# Patient Record
Sex: FEMALE | Race: BLACK OR AFRICAN AMERICAN | NOT HISPANIC OR LATINO | URBAN - METROPOLITAN AREA
[De-identification: names, ages, dates, MRNs, and addresses within clinical notes are randomized per-mention and may not be internally consistent; named-entity substitution may affect disease eponyms.]

---

## 2017-01-17 ENCOUNTER — HOSPITAL ENCOUNTER (EMERGENCY)
Facility: MEDICAL CENTER | Age: 22
End: 2017-01-17
Attending: EMERGENCY MEDICINE

## 2017-01-17 VITALS
HEIGHT: 60 IN | TEMPERATURE: 98 F | RESPIRATION RATE: 18 BRPM | WEIGHT: 140 LBS | BODY MASS INDEX: 27.48 KG/M2 | SYSTOLIC BLOOD PRESSURE: 134 MMHG | DIASTOLIC BLOOD PRESSURE: 75 MMHG | HEART RATE: 112 BPM | OXYGEN SATURATION: 96 %

## 2017-01-17 DIAGNOSIS — F19.10 POLYSUBSTANCE ABUSE (HCC): ICD-10-CM

## 2017-01-17 DIAGNOSIS — R41.82 ALTERED MENTAL STATUS, UNSPECIFIED ALTERED MENTAL STATUS TYPE: ICD-10-CM

## 2017-01-17 LAB
ALBUMIN SERPL BCP-MCNC: 3.7 G/DL (ref 3.2–4.9)
ALBUMIN/GLOB SERPL: 1.5 G/DL
ALP SERPL-CCNC: 58 U/L (ref 30–99)
ALT SERPL-CCNC: 21 U/L (ref 2–50)
AMPHETAMINES UR QL: POSITIVE
ANION GAP SERPL CALC-SCNC: 9 MMOL/L (ref 0–11.9)
APAP SERPL-MCNC: <10 UG/ML (ref 10–30)
AST SERPL-CCNC: 34 U/L (ref 12–45)
BARBITURATES UR QL SCN: NEGATIVE
BASOPHILS # BLD AUTO: 0.4 % (ref 0–1.8)
BASOPHILS # BLD: 0.03 K/UL (ref 0–0.12)
BENZODIAZ UR QL SCN: NEGATIVE
BILIRUB SERPL-MCNC: 1.2 MG/DL (ref 0.1–1.5)
BUN SERPL-MCNC: 14 MG/DL (ref 8–22)
BZE UR QL SCN: NEGATIVE
CALCIUM SERPL-MCNC: 9 MG/DL (ref 8.4–10.2)
CHLORIDE SERPL-SCNC: 101 MMOL/L (ref 96–112)
CO2 SERPL-SCNC: 24 MMOL/L (ref 20–33)
CREAT SERPL-MCNC: 0.99 MG/DL (ref 0.5–1.4)
EKG IMPRESSION: NORMAL
EOSINOPHIL # BLD AUTO: 0.18 K/UL (ref 0–0.51)
EOSINOPHIL NFR BLD: 2.2 % (ref 0–6.9)
ERYTHROCYTE [DISTWIDTH] IN BLOOD BY AUTOMATED COUNT: 47.1 FL (ref 35.9–50)
ETHANOL BLD-MCNC: 0 G/DL
GFR SERPL CREATININE-BSD FRML MDRD: >60 ML/MIN/1.73 M 2
GLOBULIN SER CALC-MCNC: 2.5 G/DL (ref 1.9–3.5)
GLUCOSE SERPL-MCNC: 79 MG/DL (ref 65–99)
HCG SERPL QL: NEGATIVE
HCT VFR BLD AUTO: 34.9 % (ref 37–47)
HGB BLD-MCNC: 11.3 G/DL (ref 12–16)
IMM GRANULOCYTES # BLD AUTO: 0.03 K/UL (ref 0–0.11)
IMM GRANULOCYTES NFR BLD AUTO: 0.4 % (ref 0–0.9)
LYMPHOCYTES # BLD AUTO: 3.33 K/UL (ref 1–4.8)
LYMPHOCYTES NFR BLD: 39.9 % (ref 22–41)
MCH RBC QN AUTO: 27.4 PG (ref 27–33)
MCHC RBC AUTO-ENTMCNC: 32.4 G/DL (ref 33.6–35)
MCV RBC AUTO: 84.5 FL (ref 81.4–97.8)
MONOCYTES # BLD AUTO: 0.8 K/UL (ref 0–0.85)
MONOCYTES NFR BLD AUTO: 9.6 % (ref 0–13.4)
NEUTROPHILS # BLD AUTO: 3.98 K/UL (ref 2–7.15)
NEUTROPHILS NFR BLD: 47.5 % (ref 44–72)
NRBC # BLD AUTO: 0 K/UL
NRBC BLD AUTO-RTO: 0 /100 WBC
PCP UR QL SCN: NEGATIVE
PLATELET # BLD AUTO: 258 K/UL (ref 164–446)
PMV BLD AUTO: 11.1 FL (ref 9–12.9)
POTASSIUM SERPL-SCNC: 3.1 MMOL/L (ref 3.6–5.5)
PROT SERPL-MCNC: 6.2 G/DL (ref 6–8.2)
RBC # BLD AUTO: 4.13 M/UL (ref 4.2–5.4)
SALICYLATES SERPL-MCNC: <4 MG/DL (ref 15–25)
SODIUM SERPL-SCNC: 134 MMOL/L (ref 135–145)
UR OPIATES 2659: POSITIVE
UR THC 2511T: POSITIVE
UR TRICYCLIC 2660: NEGATIVE
WBC # BLD AUTO: 8.4 K/UL (ref 4.8–10.8)

## 2017-01-17 PROCEDURE — 84703 CHORIONIC GONADOTROPIN ASSAY: CPT

## 2017-01-17 PROCEDURE — 99285 EMERGENCY DEPT VISIT HI MDM: CPT

## 2017-01-17 PROCEDURE — 93005 ELECTROCARDIOGRAM TRACING: CPT | Performed by: EMERGENCY MEDICINE

## 2017-01-17 PROCEDURE — 96372 THER/PROPH/DIAG INJ SC/IM: CPT

## 2017-01-17 PROCEDURE — 85025 COMPLETE CBC W/AUTO DIFF WBC: CPT

## 2017-01-17 PROCEDURE — 700111 HCHG RX REV CODE 636 W/ 250 OVERRIDE (IP): Performed by: EMERGENCY MEDICINE

## 2017-01-17 PROCEDURE — 80053 COMPREHEN METABOLIC PANEL: CPT

## 2017-01-17 PROCEDURE — 700105 HCHG RX REV CODE 258: Performed by: EMERGENCY MEDICINE

## 2017-01-17 PROCEDURE — 96365 THER/PROPH/DIAG IV INF INIT: CPT

## 2017-01-17 PROCEDURE — 80305 DRUG TEST PRSMV DIR OPT OBS: CPT

## 2017-01-17 PROCEDURE — 96361 HYDRATE IV INFUSION ADD-ON: CPT

## 2017-01-17 PROCEDURE — 36415 COLL VENOUS BLD VENIPUNCTURE: CPT

## 2017-01-17 PROCEDURE — 80307 DRUG TEST PRSMV CHEM ANLYZR: CPT

## 2017-01-17 RX ORDER — POTASSIUM CHLORIDE 7.45 MG/ML
10 INJECTION INTRAVENOUS ONCE
Status: COMPLETED | OUTPATIENT
Start: 2017-01-17 | End: 2017-01-17

## 2017-01-17 RX ORDER — SODIUM CHLORIDE 9 MG/ML
1000 INJECTION, SOLUTION INTRAVENOUS ONCE
Status: COMPLETED | OUTPATIENT
Start: 2017-01-17 | End: 2017-01-17

## 2017-01-17 RX ORDER — LORAZEPAM 2 MG/ML
1 INJECTION INTRAMUSCULAR ONCE
Status: COMPLETED | OUTPATIENT
Start: 2017-01-17 | End: 2017-01-17

## 2017-01-17 RX ORDER — DIPHENHYDRAMINE HYDROCHLORIDE 50 MG/ML
25 INJECTION INTRAMUSCULAR; INTRAVENOUS ONCE
Status: COMPLETED | OUTPATIENT
Start: 2017-01-17 | End: 2017-01-17

## 2017-01-17 RX ORDER — HALOPERIDOL 5 MG/ML
5 INJECTION INTRAMUSCULAR ONCE
Status: COMPLETED | OUTPATIENT
Start: 2017-01-17 | End: 2017-01-17

## 2017-01-17 RX ORDER — SULFAMETHOXAZOLE AND TRIMETHOPRIM 800; 160 MG/1; MG/1
1 TABLET ORAL 2 TIMES DAILY
Qty: 14 TAB | Refills: 0 | Status: SHIPPED | OUTPATIENT
Start: 2017-01-17 | End: 2017-01-24

## 2017-01-17 RX ADMIN — DIPHENHYDRAMINE HYDROCHLORIDE 25 MG: 50 INJECTION INTRAMUSCULAR; INTRAVENOUS at 08:15

## 2017-01-17 RX ADMIN — HALOPERIDOL LACTATE 5 MG: 5 INJECTION, SOLUTION INTRAMUSCULAR at 08:14

## 2017-01-17 RX ADMIN — POTASSIUM CHLORIDE 10 MEQ: 7.46 INJECTION, SOLUTION INTRAVENOUS at 11:27

## 2017-01-17 RX ADMIN — SODIUM CHLORIDE 1000 ML: 9 INJECTION, SOLUTION INTRAVENOUS at 09:18

## 2017-01-17 RX ADMIN — LORAZEPAM 1 MG: 2 INJECTION INTRAMUSCULAR; INTRAVENOUS at 08:14

## 2017-01-17 ASSESSMENT — PAIN SCALES - GENERAL: PAINLEVEL_OUTOF10: ASSUMED PAIN PRESENT

## 2017-01-17 NOTE — ED PROVIDER NOTES
ED Provider Note    CHIEF COMPLAINT  Chief Complaint   Patient presents with   • ALOC       HPI  Miley Henry is a 21 y.o. female who presents to the ER with altered mental status. [place] There is not been any known injury. She denied drinking alcohol or using any drugs although presented quite agitated. She states she hurts all over. She cannot tell me any specific area that is bothering her. She denies having a fever. She hasn't been coughing. She yells periodically.    REVIEW OF SYSTEMS  Unable to obtain    PAST MEDICAL HISTORY  Asthma, seizure, unspecified psychiatric disturbance    SOCIAL HISTORY  Social History   Substance Use Topics   • Smoking status: Current Every Day Smoker   • Smokeless tobacco: None   • Alcohol Use: No       SURGICAL HISTORY  History reviewed. No pertinent past surgical history.    CURRENT MEDICATIONS  See chart    ALLERGIES  Allergies   Allergen Reactions   • Milk-Related Compounds Diarrhea and Vomiting   • Peanut-Derived Anaphylaxis   • Penicillins Anaphylaxis       PHYSICAL EXAM  VITAL SIGNS: BP 99/64 mmHg  Pulse 109  Temp(Src) 36.7 °C (98 °F)  Resp 12  Ht 1.524 m (5')  Wt 63.504 kg (140 lb)  BMI 27.34 kg/m2  SpO2 97%   Constitutional: Awake and alert. Agitated. Scratching himself vigorously  HENT:  Atraumatic, Normocephalic.Oropharynx moist mucus membranes, Nose normal inspection.   Eyes: Normal inspection  Neck: Supple  Cardiovascular: Mildly tachycardic heart rate, Normal rhythm.  Symmetric peripheral pulses.   Thorax & Lungs: No respiratory distress, No wheezing, No rales, No rhonchi, No chest tenderness.   Abdomen: Bowel sounds normal, soft, non-distended, nontender, no mass  Skin: Multiple areas of excoriation. Several areas of apparent picking behavior over the dorsal hand with mild surrounding redness.  Back: No tenderness, No CVA tenderness.   Extremities: No clubbing, cyanosis, edema, no Homans or cords    Neurologic: Awake and alert. Moves all extremities symmetrically.  Psychiatric: Agitated. Occasionally aggressive    Labs:  Results for orders placed or performed during the hospital encounter of 01/17/17   CBC WITH DIFFERENTIAL   Result Value Ref Range    WBC 8.4 4.8 - 10.8 K/uL    RBC 4.13 (L) 4.20 - 5.40 M/uL    Hemoglobin 11.3 (L) 12.0 - 16.0 g/dL    Hematocrit 34.9 (L) 37.0 - 47.0 %    MCV 84.5 81.4 - 97.8 fL    MCH 27.4 27.0 - 33.0 pg    MCHC 32.4 (L) 33.6 - 35.0 g/dL    RDW 47.1 35.9 - 50.0 fL    Platelet Count 258 164 - 446 K/uL    MPV 11.1 9.0 - 12.9 fL    Neutrophils-Polys 47.50 44.00 - 72.00 %    Lymphocytes 39.90 22.00 - 41.00 %    Monocytes 9.60 0.00 - 13.40 %    Eosinophils 2.20 0.00 - 6.90 %    Basophils 0.40 0.00 - 1.80 %    Immature Granulocytes 0.40 0.00 - 0.90 %    Nucleated RBC 0.00 /100 WBC    Neutrophils (Absolute) 3.98 2.00 - 7.15 K/uL    Lymphs (Absolute) 3.33 1.00 - 4.80 K/uL    Monos (Absolute) 0.80 0.00 - 0.85 K/uL    Eos (Absolute) 0.18 0.00 - 0.51 K/uL    Baso (Absolute) 0.03 0.00 - 0.12 K/uL    Immature Granulocytes (abs) 0.03 0.00 - 0.11 K/uL    NRBC (Absolute) 0.00 K/uL   COMP METABOLIC PANEL   Result Value Ref Range    Sodium 134 (L) 135 - 145 mmol/L    Potassium 3.1 (L) 3.6 - 5.5 mmol/L    Chloride 101 96 - 112 mmol/L    Co2 24 20 - 33 mmol/L    Anion Gap 9.0 0.0 - 11.9    Glucose 79 65 - 99 mg/dL    Bun 14 8 - 22 mg/dL    Creatinine 0.99 0.50 - 1.40 mg/dL    Calcium 9.0 8.4 - 10.2 mg/dL    AST(SGOT) 34 12 - 45 U/L    ALT(SGPT) 21 2 - 50 U/L    Alkaline Phosphatase 58 30 - 99 U/L    Total Bilirubin 1.2 0.1 - 1.5 mg/dL    Albumin 3.7 3.2 - 4.9 g/dL    Total Protein 6.2 6.0 - 8.2 g/dL    Globulin 2.5 1.9 - 3.5 g/dL    A-G Ratio 1.5 g/dL   HCG QUAL SERUM   Result Value Ref Range    Beta-Hcg Qualitative Serum Negative Negative   Acetaminophen Level   Result Value Ref Range    Acetomenophen -Tylenol <10 10 - 30 ug/mL   SALICYLATE LEVEL   Result Value Ref Range    Salicylates, Quant. <4  (L) 15 - 25 mg/dL   Blood Alcohol   Result Value Ref Range    Diagnostic Alcohol 0.00 0.00 g/dL   ESTIMATED GFR   Result Value Ref Range    GFR If African American >60 >60 mL/min/1.73 m 2    GFR If Non African American >60 >60 mL/min/1.73 m 2   UR DRUG SCREEN([facility] ONLY)   Result Value Ref Range    Phencyclidine -Pcp Negative Negative    Benzodiazepines Negative Negative    Cocaine Metabolite Negative Negative    Amphetamines By Triage Positive (A) Negative    Urine THC Positive (A) Negative    Codeine-Morphine Positive (A) Negative    Barbiturates Negative Negative    Tricyclic Antidepressants Negative Negative   EKG (NOW)   Result Value Ref Range    Report       [facility] Emergency Dept.    Test Date:  2017  Pt Name:    KHALIF GRESHAM               Department: EDSM  MRN:        [id number]                      [facility]  Gender:     F                            Technician: KJ  :        [id number]                   Requested By:JESENIA RIVAS  Order #:    [id number]                    Reading MD: JESENIA RIVAS MD    Measurements  Intervals                                Axis  Rate:       77                           P:          24  NY:         152                          QRS:        56  QRSD:       90                           T:          47  QT:         340  QTc:        385    Interpretive Statements  SINUS RHYTHM  BORDERLINE T WAVE ABNORMALITIES  No previous ECG available for comparison    Electronically Signed On 2017 13:46:25 PST by JESENIA RIVAS MD           COURSE & MEDICAL DECISION MAKING  Patient presents to the ER with altered mental status. Her clinical presentation is most consistent with amphetamine abuse. She does not have any evidence of trauma on her exam. She complained of pain everywhere, but specifically denied a headache. She was afebrile. She does appear to have areas of superficial skin infection on her exam. I obtained laboratory  data . She has mild hypokalemia and was given KCl 10 mEq IV. No evidence of toxic overdose. She was quite agitated on arrival and aggressive. She was sedated with Benadryl, Haldol and Ativan. At this point she is continuing to metabolize these medications although is waking up and interacting more. When she is sober she will be reassessed and dispositioned appropriately.    FINAL IMPRESSION  1. Altered mental status  2. Polysubstance abuse  3. Agitation       This dictation was created using voice recognition software. The accuracy of the dictation is limited to the abilities of the software.  The nursing notes were reviewed and certain aspects of this information were incorporated into this note.      Electronically signed by: Ronny Oden, 1/17/2017 1:43 PM

## 2017-01-17 NOTE — DISCHARGE INSTRUCTIONS
Substance Abuse  Your exam indicates that you have a problem with substance abuse. Substance abuse is the misuse of alcohol or drugs that causes problems in family life, friendships, and work relationships. Substance abuse is the most important cause of premature illness, disability, and death in our society. It is also the greatest threat to a person's mental and spiritual well being.  Substance abuse can start out in an innocent way, such as social drinking or taking a little extra medication prescribed by your doctor. No one starts out with the intention of becoming an alcoholic or an addict. Substance abuse victims cannot control their use of alcohol or drugs. They may become intoxicated daily or go on weekend binges. Often there is a strong desire to quit, but attempts to stop using often fail. Encounters with law enforcement or conflicts with family members, friends, and work associates are signs of a potential problem.  Recovery is always possible, although the craving for some drugs makes it difficult to quit without assistance. Many treatment programs are available to help people stop abusing alcohol or drugs. The first step in treatment is to admit you have a problem. This is a major mt because denial is a powerful force with substance abuse.  Alcoholics Anonymous, Narcotics Anonymous, Cocaine Anonymous, and other recovery groups and programs can be very useful in helping people to quit. If you do not feel okay about your drug or alcohol use and if it is causing you trouble, we want to encourage you to talk about it with your doctor or with someone from a recovery group who can help you. You could also call the National [place] on Drug Abuse at 6-129-562-HELP. It is up to you to take the first step.  AL-NIKKIE and MAHIN-TEEN are support groups for friends and family members of an alcohol or drug dependent person. The people who love and care for the alcoholic or addicted person often need help, too. For  information about these organizations, check your phone directory or call a local alcohol or drug treatment center.  Document Released: 01/25/2006 Document Revised: 03/11/2013 Document Reviewed: 12/19/2009  [employer]® Patient Information ©2014 [employer], [employer].

## 2017-01-17 NOTE — ED NOTES
Pt awakened from sleep. Pt given apple juice. Pt assisted w dressing per edt. Bus pass obtained for pt's use.

## 2017-01-17 NOTE — ED AVS SNAPSHOT
Audibase Access Code: [email or web address]  Expires: 2/16/2017  3:57 PM    Your email address is not on file at [facility].  Email Addresses are required for you to sign up for Audibase, please contact [phone] to verify your personal information and to provide your email address prior to attempting to register for Audibase.    Miley Henry  [facility] [place] [address]    Audibase  A secure, online tool to manage your health information     [employer]’s Audibase® is a secure, online tool that connects you to your personalized health information from the privacy of your home -- day or night - making it very easy for you to manage your healthcare. Once the activation process is completed, you can even access your medical information using the Audibase dar, which is available for free in the Apple Dar store or Google Play store.     To learn more about Audibase, visit [email or web address]    There are two levels of access available (as shown below):   My Chart Features  [facility] Primary Care Doctor [facility]  Specialists [facility]  Urgent  Care Non-[facility] Primary Care Doctor   Email your healthcare team securely and privately 24/7 X X X    Manage appointments: schedule your next appointment; view details of past/upcoming appointments X      Request prescription refills. X      View recent personal medical records, including lab and immunizations X X X X   View health record, including health history, allergies, medications X X X X   Read reports about your outpatient visits, procedures, consult and ER notes X X X X   See your discharge summary, which is a recap of your hospital and/or ER visit that includes your diagnosis, lab results, and care plan X X  X     How to register for Audibase:  Once your e-mail address has been verified, follow the following steps to sign up for Audibase.     1. Go to  [email or web address]  2. Click on the Sign Up Now box, which takes you to the New Member Sign Up page. You will  need to provide the following information:  a. Enter your [employer] Access Code exactly as it appears at the top of this page. (You will not need to use this code after you’ve completed the sign-up process. If you do not sign up before the expiration date, you must request a new code.)   b. Enter your date of birth.   c. Enter your home email address.   d. Click Submit, and follow the next screen’s instructions.  3. Create a [employer] ID. This will be your [employer] login ID and cannot be changed, so think of one that is secure and easy to remember.  4. Create a [employer] password. You can change your password at any time.  5. Enter your Password Reset Question and Answer. This can be used at a later time if you forget your password.   6. Enter your e-mail address. This allows you to receive e-mail notifications when new information is available in [employer].  7. Click Sign Up. You can now view your health information.    For assistance activating your [employer] account, call [phone]
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
## 2017-01-17 NOTE — ED AVS SNAPSHOT
1/17/2017          Miley Henry  [address]  [place] [address]    Dear Miley:    [facility] wants to ensure your discharge home is safe and you or your loved ones have had all your questions answered regarding your care after you leave the hospital.    You may receive a telephone call within two days of your discharge.  This call is to make certain you understand your discharge instructions as well as ensure we provided you with the best care possible during your stay with us.     The call will only last approximately 3-5 minutes and will be done by a nurse.    Once again, we want to ensure your discharge home is safe and that you have a clear understanding of any next steps in your care.  If you have any questions or concerns, please do not hesitate to contact us, we are here for you.  Thank you for choosing [facility] for your healthcare needs.    Sincerely,    Phil Fischer    [facility]
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
## 2017-01-17 NOTE — ED NOTES
"Daron steel from [facility]. damián reports pt slumped over in chair at [facility]. Pt states she smokes cigarettes. Pt denies drug or alcohol use. Pt mumbles. Pt telling staff to \"fuck off\". Dr Oden at bedside for pt exam.   "

## 2017-01-17 NOTE — ED AVS SNAPSHOT
After Visit Summary                                                                                                                Miley Henry   MRN: [id number]    Department:  [facility], Emergency Dept   Date of Visit:  1/17/2017            [facility], Emergency Dept    [address]    [place] [address]    Phone:  [phone]      You were seen by     1. Ronny Oden M.D.    2. Francie Church D.O.      Your Diagnosis Was     Altered mental status, unspecified altered mental status type     R41.82       These are the medications you received during your hospitalization from 01/17/2017 0752 to 01/17/2017 1606     Date/Time Order Dose Route Action    01/17/2017 0814 lorazepam (ATIVAN) injection 1 mg 1 mg Intramuscular Given    01/17/2017 0814 haloperidol lactate (HALDOL) injection 5 mg 5 mg Intramuscular Given    01/17/2017 0815 diphenhydrAMINE (BENADRYL) injection 25 mg 25 mg Intramuscular Given    01/17/2017 0918 NS infusion 1,000 mL 1,000 mL Intravenous New Bag    01/17/2017 1127 potassium chloride in water (KCL) ivpb 10 mEq 10 mEq Intravenous New Bag      Follow-up Information     1. Follow up with Kayla Hernandez M.D..    Specialty:  Family Medicine    Contact information    [address] [place] [address]  [address]  [place] [address] [phone]        Medication Information     Review all of your home medications and newly ordered medications with your primary doctor and/or pharmacist as soon as possible. Follow medication instructions as directed by your doctor and/or pharmacist.     Please keep your complete medication list with you and share with your physician. Update the information when medications are discontinued, doses are changed, or new medications (including over-the-counter products) are added; and carry medication information at all times in the event of emergency situations.               Medication List      START taking these medications        Instructions    sulfamethoxazole-trimethoprim 800-160 MG tablet   Commonly known as:  BACTRIM DS    Take 1 Tab by mouth 2 times a day for 7 days.   Dose:  1 Tab         ASK your doctor about these medications        Instructions    acetaminophen-codeine #3 300-30 MG Tabs   Commonly known as:  TYLENOL #3    Take 1 Tab by mouth every four hours as needed for Mild Pain (mild-to-moderate pain). Maximum 12 tabs in 24 hours   Dose:  1 Tab       clonidine 0.2 MG Tabs   Commonly known as:  CATAPRESS    Take 0.2 mg by mouth 2 times a day.   Dose:  0.2 mg       ibuprofen 600 MG Tabs   Commonly known as:  MOTRIN    Take 600 mg by mouth every 6 hours as needed.   Dose:  600 mg       LAMICTAL 25 MG Tabs   Generic drug:  lamotrigine    Take 50 mg by mouth every day.   Dose:  50 mg       polymixin-trimethoprim 67829-5.1 UNIT/ML-% Soln   Commonly known as:  POLYTRIM    2 Drops by Ophthalmic route 3 times a day.   Dose:  2 Drop       QVAR 80 MCG/ACT inhaler   Generic drug:  beclomethasone    Inhale 1 Puff by mouth 2 times a day.   Dose:  1 Puff       RITALIN 10 MG Tabs   Generic drug:  methylphenidate    Take 10 mg by mouth 2 times a day.   Dose:  10 mg       * SEROQUEL 50 MG tablet   Generic drug:  quetiapine    Take 50 mg by mouth 2 times a day.   Dose:  50 mg       * SEROQUEL 100 MG Tabs   Generic drug:  quetiapine    Take 100 mg by mouth 2 times a day.   Dose:  100 mg       VENTOLIN  (90 BASE) MCG/ACT Aers inhalation aerosol   Generic drug:  albuterol    Inhale 2 Puffs by mouth every 6 hours as needed.   Dose:  2 Puff       * Notice:  This list has 2 medication(s) that are the same as other medications prescribed for you. Read the directions carefully, and ask your doctor or other care provider to review them with you.            Procedures and tests performed during your visit     Acetaminophen Level    Blood Alcohol    CBC WITH DIFFERENTIAL    COMP METABOLIC PANEL    EKG (NOW)    ESTIMATED GFR    HCG QUAL SERUM     SALICYLATE LEVEL    UR DRUG SCREEN(SO FRAUSTO ONLY)        Discharge Instructions       Substance Abuse  Your exam indicates that you have a problem with substance abuse. Substance abuse is the misuse of alcohol or drugs that causes problems in family life, friendships, and work relationships. Substance abuse is the most important cause of premature illness, disability, and death in our society. It is also the greatest threat to a person's mental and spiritual well being.  Substance abuse can start out in an innocent way, such as social drinking or taking a little extra medication prescribed by your doctor. No one starts out with the intention of becoming an alcoholic or an addict. Substance abuse victims cannot control their use of alcohol or drugs. They may become intoxicated daily or go on weekend binges. Often there is a strong desire to quit, but attempts to stop using often fail. Encounters with law enforcement or conflicts with family members, friends, and work associates are signs of a potential problem.  Recovery is always possible, although the craving for some drugs makes it difficult to quit without assistance. Many treatment programs are available to help people stop abusing alcohol or drugs. The first step in treatment is to admit you have a problem. This is a major mt because denial is a powerful force with substance abuse.  Alcoholics Anonymous, Narcotics Anonymous, Cocaine Anonymous, and other recovery groups and programs can be very useful in helping people to quit. If you do not feel okay about your drug or alcohol use and if it is causing you trouble, we want to encourage you to talk about it with your doctor or with someone from a recovery group who can help you. You could also call the National [place] on Drug Abuse at 9-683-042-HELP. It is up to you to take the first step.  [employer]ON and ALA-TEEN are support groups for friends and family members of an alcohol or drug dependent person. The  people who love and care for the alcoholic or addicted person often need help, too. For information about these organizations, check your phone directory or call a local alcohol or drug treatment center.  Document Released: 01/25/2006 Document Revised: 03/11/2013 Document Reviewed: 12/19/2009  Exit[employer]® Patient Information ©2014 [employer].            Patient Information     Patient Information    Following emergency treatment: all patient requiring follow-up care must return either to a private physician or a clinic if your condition worsens before you are able to obtain further medical attention, please return to the emergency room.     Billing Information    At [facility], we work to make the billing process streamlined for our patients.  Our Representatives are here to answer any questions you may have regarding your hospital bill.  If you have insurance coverage and have supplied your insurance information to us, we will submit a claim to your insurer on your behalf.  Should you have any questions regarding your bill, we can be reached online or by phone as follows:  Online: You are able pay your bills online or live chat with our representatives about any billing questions you may have. We are here to help Monday - Friday from 8:00am to 7:30pm and 9:00am - 12:00pm on Saturdays.  Please visit [email or web address]  for more information.   Phone:  [phone] or [phone]    Please note that your emergency physician, surgeon, pathologist, radiologist, anesthesiologist, and other specialists are not employed by [facility] and will therefore bill separately for their services.  Please contact them directly for any questions concerning their bills at the numbers below:     Emergency Physician Services:  [phone]  [facility]:  [phone]  [facility]:  [phone]  [facility]:  [phone]    1. Your final bill may  vary from the amount quoted upon discharge if all procedures are not complete at that time, or if your doctor has additional procedures of which we are not aware. You will receive an additional bill if you return to the Emergency Department at [facility] for suture removal regardless of the facility of which the sutures were placed.     2. Please arrange for settlement of this account at the emergency registration.    3. All self-pay accounts are due in full at the time of treatment.  If you are unable to meet this obligation then payment is expected within 4-5 days.     4. If you have had radiology studies (CT, X-ray, Ultrasound, MRI), you have received a preliminary result during your emergency department visit. Please contact the radiology department [phone] to receive a copy of your final result. Please discuss the Final result with your primary physician or with the follow up physician provided.     Crisis Hotline:  [place] Crisis Hotline:  7-499-FWBDNBB or 1-479.965.1352  [place] Crisis Hotline:    1-106.402.2075 or 069-644-9222         ED Discharge Follow Up Questions    1. In order to provide you with very good care, we would like to follow up with a phone call in the next few days.  May we have your permission to contact you?     YES /  NO    2. What is the best phone number to call you? (       )_____-__________    3. What is the best time to call you?      Morning  /  Afternoon  /  Evening                   Patient Signature:  ____________________________________________________________    Date:  ____________________________________________________________

## 2017-01-18 NOTE — ED NOTES
Cab vouchers given, cab called. Pt amb to lobby w security. Pt amb to cab.  received instr to drive her to [facility]. Cab voucher specified destination: [facility].

## 2017-01-18 NOTE — ED NOTES
[facility] called and unable to pick-up pt. Pt requires transportation to [facility]. Erick Soliz called for cab voucher.

## 2017-01-18 NOTE — ED NOTES
Pt exhibits unsteady gait, pt using foul language, and excessively spilling her food. Pt not following instr  Pt expresses desire to sleep. Pt unable to provide a # to call for a friend or family to pick her up and transport her home safely. Pt unable to care for herself at this time. POC melody Church,  [place]. [employer] called for protective custody services. Pt informed re: poc.